# Patient Record
Sex: FEMALE | Race: WHITE
[De-identification: names, ages, dates, MRNs, and addresses within clinical notes are randomized per-mention and may not be internally consistent; named-entity substitution may affect disease eponyms.]

---

## 2020-11-17 ENCOUNTER — HOSPITAL ENCOUNTER (EMERGENCY)
Dept: HOSPITAL 62 - ER | Age: 40
Discharge: HOME | End: 2020-11-17
Payer: MEDICAID

## 2020-11-17 VITALS — SYSTOLIC BLOOD PRESSURE: 151 MMHG | DIASTOLIC BLOOD PRESSURE: 92 MMHG
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
## 2020-11-17 DIAGNOSIS — R20.0: ICD-10-CM

## 2020-11-17 DIAGNOSIS — Z90.710: ICD-10-CM
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
## 2020-11-17 DIAGNOSIS — S46.911A: Primary | ICD-10-CM

## 2020-11-17 PROCEDURE — 73030 X-RAY EXAM OF SHOULDER: CPT

## 2020-11-17 PROCEDURE — 99283 EMERGENCY DEPT VISIT LOW MDM: CPT

## 2020-11-17 NOTE — ER DOCUMENT REPORT
HPI





- HPI


Time Seen by Provider: 11/17/20 13:16


Pain Level: 5


Notes: 





40-year-old female presents to the emergency room today for evaluation of her 

right shoulder after she was walking around her 50 pound dog yesterday, the dog 

saw a cat and when after it, pulling her right shoulder.  Denies any falling 

onto her shoulder trauma.  She states that she did hear a "pop".  Patient 

reports that she did have a right shoulder tendon that was repaired a year ago. 

Tried over-the-counter ibuprofen and Tylenol without for relief.  Does report 

some numbness and tingling to her right fifth fourth third fingers.  Reports she

has had a complete hysterectomy in 2012.  Reports pain is 3 out of 5, throbbing 

achy.  Worse with time, nothing makes better.  Has not tried any icing or 

heating.











- MUSCULOSKELETAL


Musculoskeletal: REPORTS: Extremity pain





Past Medical History





- General


Information source: Patient





- Social History


Smoking Status: Never Smoker


Frequency of alcohol use: None


Drug Abuse: None


Family History: Reviewed & Not Pertinent





Vertical Provider Document





- CONSTITUTIONAL


Agree With Documented VS: Yes


Exam Limitations: No Limitations


General Appearance: WD/WN


Notes: 








MEDICATIONS: I agree with the patient medications as charted by the RN.





ALLERGIES: I agree with the allergies as charted by the RN.





PAST MEDICAL HISTORY/PAST SURGICAL HISTORY: Reviewed and agree as charted by RN.





SOCIAL HISTORY: Reviewed and agree as charted by RN.





FAMILY HISTORY: No significant familial comorbid conditions directly related to 

patient complaint





EXAM:


Reviewed vital signs as charted by RN.





PHYSICAL EXAMINATION: reviewed vital signs by RN





GENERAL: Well-appearing, well-nourished and in no acute distress.





HEAD: Atraumatic, normocephalic.





EYES: Pupils equal round and reactive to light, extraocular movements intact, 

conjunctiva are normal.





ENT: Nares patent, oropharynx clear without exudates.  Moist mucous membranes.





NECK: Normal range of motion, supple without lymphadenopathy





LUNGS: Breath sounds clear to auscultation bilaterally and equal.  No wheezes 

rales or rhonchi.





HEART: Regular rate and rhythm without murmurs





ABDOMEN: Soft, nontender, nondistended abdomen.  No guarding, no rebound.  No 

masses appreciated.





Female : deferred





Musculoskeletal: Normal range of motion, no pitting or edema.  No cyanosis. 

right shoulder pain on anterior aspect with palpation. noted right shoulder pain

with abduction and flexion. no pain with supination, pronation, extension.  

 + 2 BUE equally. APROM in shoulder. DTR +2 in BUE equally. Noted crepitus 

with APROM in elbow. negative drop arm, neer sign. positive impingement sign all

on right . No  vascular compromise. Neck with full APROM, no cervical spinal 

tenderness. No tenderness over clavicles or step off noted bilaterally.  

Strength 5 out of 5 in bilateral upper extremities equally.








NEUROLOGICAL: Cranial nerves grossly intact.  Normal speech, normal gait.  

Normal sensory, motor exams





PSYCH: Normal mood, normal affect.





SKIN: Warm, Dry, normal turgor, no rashes or lesions noted.





Course





- Re-evaluation


Re-evalutation: 





11/17/20 15:23


Afebrile vital stable no distress.  Nurses notes reviewed.  X-ray of right 

shoulder negative for any acute fracture.  Patient given sling.  Advised to 

follow-up with orthopedic specialist within 24 to 48 hours.  Take muscle 

relaxers and naproxen as directed, do not drive, drink alcohol or operate heavy 

machinery while taking medications like laceration or impairment of cognitive 

function.  After performing a Medical Screening Examination, I estimate there is

LOW risk for OPEN FRACTURE, COMPARTMENT SYNDROME, DEEP VENOUS THROMBOSIS, ACUTE 

TENDON RUPTURE, or NEUROVASCULAR INJURY thus I consider the discharge 

disposition reasonable.  I have reevaluated this patient multiple times and no 

significant life threatening changes are noted. The patient and I have discussed

the diagnosis and risks, and we agree with discharging home to closely follow-up

with their primary doctor or the referral orthopedist with the understanding 

that symptoms and presentations can change. We also discussed returning to the 

Emergency Department immediately if new or worsening symptoms occur. We have 

discussed the symptoms which are most concerning (e.g., changing or worsening 

pain, numbness, weakness) that necessitate immediate return


11/17/20 15:52








- Vital Signs


Vital signs: 


                                        











Temp Pulse Resp BP Pulse Ox


 


 98.7 F   107 H  18   151/92 H  97 


 


 11/17/20 12:26  11/17/20 12:26  11/17/20 12:26  11/17/20 12:26  11/17/20 12:26














Discharge





- Discharge


Clinical Impression: 


 Right shoulder strain





Condition: Stable


Disposition: HOME, SELF-CARE


Instructions:  Muscle Relaxers ([facility]), Muscle Strain ([facility]), Shoulder Injury 

([facility]), Sling to be Used ([facility])


Additional Instructions: 


Your x-ray today was negative for any acute fracture dislocation.  You were 

given a sling to help support your shoulder.  Please take muscle relaxers as 

directed, do not drive, drink alcohol or operate heavy machinery while taking 

medication as cause sedation or impairment of cognitive function.  Please 

follow-up with orthopedic specialist as well as a primary care provider within 

the next 24 to 48 hours.  Return immediately for any new or worsening symptoms.





Follow up with primary care provider, call tomorrow to make followup 

appointment.


Prescriptions: 


Naproxen 500 mg PO BID #10 tablet


Methocarbamol [Robaxin 500 mg Tablet] 500 mg PO QID PRN #15 tablet


 PRN Reason: 


Referrals: 


RENATA ORTEGA DO [ACTIVE STAFF] - Follow up as needed


BRIAN GARIBAY MD [NO LOCAL MD] - Follow up as needed

## 2020-11-17 NOTE — RADIOLOGY REPORT (SQ)
EXAM DESCRIPTION:  SHOULDER RIGHT 2 OR MORE VIEWS



IMAGES COMPLETED DATE/TIME:  11/17/2020 1:56 pm



REASON FOR STUDY:  R shoulder pain, 150lb dog pulled her, +pain



COMPARISON:  None.



NUMBER OF VIEWS:  Three views.



TECHNIQUE:  Internal rotation, external rotation, and Y view images acquired of the right shoulder.



LIMITATIONS:  None.



FINDINGS:  MINERALIZATION: Normal.

BONES: No acute fracture.  Degenerative changes in the acromioclavicular joint with osteophytes.  No 
worrisome bone lesions.

JOINTS: No dislocation.

VISUALIZED LUNGS AND RIBS: No pneumothorax.  No rib fracture.

SOFT TISSUES: No radiopaque foreign body.

OTHER: No other significant finding.



IMPRESSION:  DEGENERATIVE CHANGES. NO RADIOGRAPHIC EVIDENCE OF ACUTE INJURY.



TECHNICAL DOCUMENTATION:  JOB ID:  [id number]

 2011 [employer]- All Rights Reserved



Reading location - IP/workstation name: [email or web address]